# Patient Record
Sex: MALE | Race: ASIAN | NOT HISPANIC OR LATINO | URBAN - METROPOLITAN AREA
[De-identification: names, ages, dates, MRNs, and addresses within clinical notes are randomized per-mention and may not be internally consistent; named-entity substitution may affect disease eponyms.]

---

## 2023-09-05 ENCOUNTER — EMERGENCY (EMERGENCY)
Facility: HOSPITAL | Age: 48
LOS: 1 days | Discharge: HOME | End: 2023-09-05
Attending: EMERGENCY MEDICINE | Admitting: EMERGENCY MEDICINE
Payer: MEDICAID

## 2023-09-05 VITALS
SYSTOLIC BLOOD PRESSURE: 119 MMHG | HEART RATE: 93 BPM | TEMPERATURE: 98 F | OXYGEN SATURATION: 99 % | RESPIRATION RATE: 16 BRPM | DIASTOLIC BLOOD PRESSURE: 77 MMHG

## 2023-09-05 PROCEDURE — 99283 EMERGENCY DEPT VISIT LOW MDM: CPT

## 2023-09-05 RX ORDER — DIPHENHYDRAMINE HCL 50 MG
2 CAPSULE ORAL
Qty: 10 | Refills: 0
Start: 2023-09-05 | End: 2023-09-09

## 2023-09-05 RX ORDER — OXYMETAZOLINE HYDROCHLORIDE 0.5 MG/ML
1 SPRAY NASAL ONCE
Refills: 0 | Status: DISCONTINUED | OUTPATIENT
Start: 2023-09-05 | End: 2023-09-09

## 2023-09-05 RX ORDER — OXYMETAZOLINE HYDROCHLORIDE 0.5 MG/ML
2 SPRAY NASAL
Qty: 1 | Refills: 1
Start: 2023-09-05 | End: 2023-09-10

## 2023-09-05 RX ORDER — LORATADINE 10 MG/1
1 TABLET ORAL
Qty: 10 | Refills: 3
Start: 2023-09-05 | End: 2023-10-14

## 2023-09-05 NOTE — ED PROVIDER NOTE - NSFOLLOWUPINSTRUCTIONS_ED_ALL_ED_FT
Epistaxis    Epistaxis is the medical term for a nosebleed. Nosebleeds are common and can be caused by many conditions, such as injury, infections, dry environments, medicines, nose picking, and home heating and cooling systems. Try controlling your nosebleed by pinching your nose continuously for at least 10 minutes. Avoid lying down while you are having a nosebleed. Sit up and lean forward. Avoid blowing or sniffing your nose for a number of hours after having a nosebleed. Resume your normal activities as you are able, but avoid straining, lifting, or bending at the waist for several days. Maintain humidity in your home by using less air conditioning or by using a humidifier.     Aspirin and blood thinners make bleeding more likely. If you are prescribed these medicines and you suffer from nosebleeds, ask your health care provider if you should stop taking the medicines or adjust the dose. Do not stop medicines unless directed by your health care provider.    SEEK IMMEDIATE MEDICAL CARE IF YOU HAVE ANY OF THE FOLLOWING SYMPTOMS: nosebleed lasting longer than 20 minutes, unusual bleeding from or bruising on other parts of your body, dizziness or lightheadedness, fainting, nosebleed occurring after a head injury, or fever. [facility] - ENT  Otolaryngology (ENT)  [address] [place] [address]  Phone: [phone]    [facility]  OTOLARYNGOLOGY  [address], [address]  [place] [address]  Phone: [phone]  Fax: [phone]  Established Patient    Epistaxis    Epistaxis is the medical term for a nosebleed. Nosebleeds are common and can be caused by many conditions, such as injury, infections, dry environments, medicines, nose picking, and home heating and cooling systems. Try controlling your nosebleed by pinching your nose continuously for at least 10 minutes. Avoid lying down while you are having a nosebleed. Sit up and lean forward. Avoid blowing or sniffing your nose for a number of hours after having a nosebleed. Resume your normal activities as you are able, but avoid straining, lifting, or bending at the waist for several days. Maintain humidity in your home by using less air conditioning or by using a humidifier.     Aspirin and blood thinners make bleeding more likely. If you are prescribed these medicines and you suffer from nosebleeds, ask your health care provider if you should stop taking the medicines or adjust the dose. Do not stop medicines unless directed by your health care provider.    SEEK IMMEDIATE MEDICAL CARE IF YOU HAVE ANY OF THE FOLLOWING SYMPTOMS: nosebleed lasting longer than 20 minutes, unusual bleeding from or bruising on other parts of your body, dizziness or lightheadedness, fainting, nosebleed occurring after a head injury, or fever.

## 2023-09-05 NOTE — ED PROVIDER NOTE - OBJECTIVE STATEMENT
46 y/o male w/ PMH deviated nasal septum, no anticoagulation use c/o epistaxis. 1 day ago, pt was driving home from [place] (AC running in the car) and started having bilateral epistaxis. Pt arrived home and had epistaxis control. Pt woke up today early morning and noticed bilateral epistaxis that was controlled w/ pressure. Pt coming to the ED because of recurring epistaxis. Otherwise asymptomatic. Denies fevers, chills, nausea, vomiting, dizziness, chest pain, SOB, abdominal pain, dysuria, hematuria.

## 2023-09-05 NOTE — ED PROVIDER NOTE - PATIENT PORTAL LINK FT
You can access the FollowMyHealth Patient Portal offered by [facility] by registering at the following website: [email or web address]. By joining [employer]’s FollowMyHealth portal, you will also be able to view your health information using other applications (apps) compatible with our system.

## 2023-09-05 NOTE — ED ADULT TRIAGE NOTE - CHIEF COMPLAINT QUOTE
bloody nose and nasal congestion since yesterday, not actively bleeding at this time, denies pmhx no blood thinners

## 2023-09-05 NOTE — ED PROVIDER NOTE - ATTENDING CONTRIBUTION TO CARE
The patient is a 47y Male who has a past medical and surgery history of   PTED with bloody nose and nasal congestion since yesterday, not actively bleeding at this time, denies pmhx no blood thinners   Vital Signs Last 24 Hrs  T(F): 98.3 HR: 93 BP: 119/77 RR: 16 SpO2: 99% (05 Sep 2023 13:02) (99% - 99%)  PE: as described; my additions and exceptions are noted in the chart    DATA:  EKG: pending at time of evaluation  LAB: Pending at time of evaluation    IMPRESSION/RISK:  Dx= Epistaxis  Consideration include localized anterior bleed from easily treatable mechanism amenable to outpt med tx and followup   d/c with afrin decongestant "hay fever" therapy   RTED PRN  Plan

## 2023-09-05 NOTE — ED PROVIDER NOTE - CLINICAL SUMMARY MEDICAL DECISION MAKING FREE TEXT BOX
48 y/o male w/ PMH deviated nasal septum, no anticoagulation use c/o epistaxis. 1 day ago, pt was driving home from [place] (AC running in the car) and started having bilateral epistaxis. Pt arrived home and had epistaxis control. Pt woke up today early morning and noticed bilateral epistaxis that was controlled w/ pressure. Pt coming to the ED because of recurring epistaxis. Otherwise asymptomatic. Denies fevers, chills, nausea, vomiting, dizziness, chest pain, SOB, abdominal pain, dysuria, hematuria. Dried blood in left nostril. No active bleeding, asymptomatic, HDS, low concern for moderate/severe anemia (good vital signs, no active bleeding). Will provide AFRIN spray to go. Likely d/c w/ close PCP and ENT f/u.

## 2023-09-05 NOTE — ED PROVIDER NOTE - PHYSICAL EXAMINATION
GENERAL: NAD  HEENT:  Atraumatic. Dried blood in left nare, unremarkable right side. Unremarkable throat exam, uvula midline  CHEST/LUNG: Chest rise equal bilaterally  HEART: Regular rate and rhythm  ABDOMEN: Soft, Nontender, Nondistended  EXTREMITIES:  Extremities warm  PSYCH: A&Ox3  SKIN: No obvious rashes or lesions   NEUROLOGY: strength and sensation intact in all extremities. Ambulatory without difficulty.